# Patient Record
Sex: MALE
[De-identification: names, ages, dates, MRNs, and addresses within clinical notes are randomized per-mention and may not be internally consistent; named-entity substitution may affect disease eponyms.]

---

## 2019-09-12 PROBLEM — Z00.00 ENCOUNTER FOR PREVENTIVE HEALTH EXAMINATION: Status: ACTIVE | Noted: 2019-09-12

## 2019-09-18 ENCOUNTER — APPOINTMENT (OUTPATIENT)
Dept: VASCULAR SURGERY | Facility: CLINIC | Age: 49
End: 2019-09-18
Payer: COMMERCIAL

## 2019-09-18 VITALS
TEMPERATURE: 96.9 F | OXYGEN SATURATION: 96 % | WEIGHT: 235 LBS | HEIGHT: 69 IN | HEART RATE: 79 BPM | DIASTOLIC BLOOD PRESSURE: 92 MMHG | SYSTOLIC BLOOD PRESSURE: 138 MMHG | BODY MASS INDEX: 34.8 KG/M2

## 2019-09-18 DIAGNOSIS — Z80.9 FAMILY HISTORY OF MALIGNANT NEOPLASM, UNSPECIFIED: ICD-10-CM

## 2019-09-18 DIAGNOSIS — I83.893 VARICOSE VEINS OF BILATERAL LOWER EXTREMITIES WITH OTHER COMPLICATIONS: ICD-10-CM

## 2019-09-18 DIAGNOSIS — Z83.0 FAMILY HISTORY OF HUMAN IMMUNODEFICIENCY VIRUS [HIV] DISEASE: ICD-10-CM

## 2019-09-18 DIAGNOSIS — I83.10 VARICOSE VEINS OF UNSPECIFIED LOWER EXTREMITY WITH INFLAMMATION: ICD-10-CM

## 2019-09-18 DIAGNOSIS — Z86.39 PERSONAL HISTORY OF OTHER ENDOCRINE, NUTRITIONAL AND METABOLIC DISEASE: ICD-10-CM

## 2019-09-18 DIAGNOSIS — L30.9 DERMATITIS, UNSPECIFIED: ICD-10-CM

## 2019-09-18 DIAGNOSIS — I83.813 VARICOSE VEINS OF BILATERAL LOWER EXTREMITIES WITH PAIN: ICD-10-CM

## 2019-09-18 DIAGNOSIS — F17.200 NICOTINE DEPENDENCE, UNSPECIFIED, UNCOMPLICATED: ICD-10-CM

## 2019-09-18 PROCEDURE — 99243 OFF/OP CNSLTJ NEW/EST LOW 30: CPT | Mod: 25

## 2019-09-18 PROCEDURE — 93970 EXTREMITY STUDY: CPT

## 2019-09-18 RX ORDER — RAMIPRIL 2.5 MG/1
2.5 CAPSULE ORAL DAILY
Refills: 0 | Status: ACTIVE | COMMUNITY

## 2019-09-18 RX ORDER — GLIMEPIRIDE 4 MG/1
4 TABLET ORAL TWICE DAILY
Refills: 0 | Status: ACTIVE | COMMUNITY

## 2019-09-18 NOTE — DATA REVIEWED
[FreeTextEntry1] : RVT Penalo performed b/l vle - shows severe rt gsv reflux, severe left gsv branch reflux and b/l branch varicose veins - no dvt no svt no deep reflux

## 2019-09-18 NOTE — ADDENDUM
[FreeTextEntry1] : CEAP Classification:\par []     C0----No visible or palpable signs of venous disease\par []     C1----Telangiectasia or reticular veins\par []     C2----Varicose veins; distinguished from reticular veins by a diameter of 3mm or more.\par []     C3----Edema\par x   V1k--Efhmurv in skin and subcutaneous tissues secondary to CVD---Pigmentation or eczema\par []     H2r--Hipsonb in skin and subcutaneous tissues secondary to CVD---Lipodermatosclerosis or atrophic meg\par []     C5----Healed venous ulcer\par []     C6----Active venous ulcer\par x   S ----Symptoms including ache, pain, tightness, skin irritation, heaviness, muscle cramps, and other venous dysfunction\par []     A ----Asymptomatic\par CEAP Sclore  =   C4, S\par \par \par Attribute                            Absent=0                    Mild=1                                   Moderate=2                                           Severe=3\par 1         Pain                          None                          Occasional                           Daily, moderate                                      Daily, severe\par          \par 2        Varicose Veins         None                          Few: branch VV                  Multiple: GS VV                                      Extensive: GS & LS   \par \par 1        Venous Edema         None                         Evening ankle only                Afternoon above ankle                          Morning above ankle\par \par 3        Skin Pigmentation      None or low             Diffuse, limited, old brown    Diffuse, lower 1/3, recent pigment        Above lower 1/3, and recent pigment\par \par 0        Inflammation              None                        Mild cellulitis                           Moderate cellulitis, lower 1/3                 Severe cellulitis, lower 1/3 & above\par \par 0        Induration                  None                        Focal (<5 cm)                        Medial or lateral, < lower 1/3                  Entire lower 1/3   \par \par 0        No.of active ulcers   0                              1                                             2                                                             >2\par \par 0        Active ulceration      None                       < 3 months                             > 3 months, < 1 year                              Not healed > 1 year\par \par 0        Active ulcer, size     None                       < 2 cm diameter                     2-6 cm diameter                                      > 6 cm diameter      \par \par 0        Compressive tx        No use/compliant    Intermittent use                      Wears most days                                   Full compliance\par \par \par 7      TOTAL (max 30 pts) Venous Clinical Severity Score\par

## 2019-09-18 NOTE — PHYSICAL EXAM
[JVD] : no jugular venous distention  [Ankle Swelling (On Exam)] : not present [2+] : left 2+ [Varicose Veins Of Lower Extremities] : bilaterally [Ankle Swelling Bilaterally] : severe [] : of the right leg [Ankle Swelling On The Left] : moderate [Purpura] : no purpura  [Petechiae] : no petechiae [Skin Ulcer] : no ulcer [Skin Induration] : no induration [Alert] : alert [Oriented to Person] : oriented to person [Oriented to Place] : oriented to place [Oriented to Time] : oriented to time [de-identified] : wdwn nad [de-identified] : wnl [de-identified] : scotl [FreeTextEntry1] : rt posterio calf and left thigh branch varicose veins, rt medial ankle venouis dermatitis; b/l leg scattered clusters of small varicose veins and telangiectasias [de-identified] : wnl [de-identified] : as above

## 2019-09-18 NOTE — REASON FOR VISIT
[Initial Evaluation] : an initial evaluation [FreeTextEntry1] : Patient referred by his PMD, Dr. Suazo.  Mr. Reyes with c/o 'itchiness' right medial ankle and varicose veins left thigh and right posterior calf.  Pt c/o ache cramps swelling heaviness fatigue and painful right medial ankle venous dermatitis. No history of venous treatment.  He had worn compression stockings in the past but does not currently wear.   He worked at [employer] and recently retired with plans to relocate to [place] in December.  Significant family history of varicose veins - mother and maternal grandmother.  VLE today confirms no DVT and no SVT bilateral leg, + reflux right gsv and + reflux left superficial tributary branch vein.  Patient with venous dermatitis right medial ankle, dilated branch varicose veins right medial knee/calf and left posterior calf and superficial branch varicose veins bilateral leg.  Recommend thigh high medical grade 20-30 mmHg compression stockings to be worn daily and during flights.  Provided patient with prescription.  Mr. Reyes would benefit from right gsv evlt, left tributary branch evlt followed by bilateral leg microphlebectomy and sclerotherapy.  He will schedule laser ablation and microphlebectomy procedures.  Reviewed post EVLT care with patient.

## 2019-10-02 ENCOUNTER — APPOINTMENT (OUTPATIENT)
Dept: VASCULAR SURGERY | Facility: CLINIC | Age: 49
End: 2019-10-02
Payer: COMMERCIAL

## 2019-10-02 VITALS
DIASTOLIC BLOOD PRESSURE: 87 MMHG | OXYGEN SATURATION: 96 % | TEMPERATURE: 97.7 F | HEART RATE: 90 BPM | SYSTOLIC BLOOD PRESSURE: 124 MMHG

## 2019-10-02 PROCEDURE — 36478 ENDOVENOUS LASER 1ST VEIN: CPT | Mod: RT

## 2019-10-02 NOTE — ADDENDUM
[FreeTextEntry1] : RN reviewed with the patient post-procedure endovenous laser ablation (EVLT) instructions, provided patient with printed copy of instructions along with Dr. Weir's cell number, and advised patient to call for any questions or concerns.\par \par Post-Endovenous Laser Ablation Therapy (EVLT) \par \par You have completed your EVLT and are on your way to better lower extremity venous health.  A medical-grade compression stocking has been placed on your leg to provide important continued support and external compression following the procedure.  \par \par The stocking is to remain in place for the next 7 days and is to be worn 24 hours/day.  This means that you need to sleep with the stocking, too.\par \par For the first three days, you cannot get the stocking wet.  Protect the stocking from getting wet.  If it gets slightly wet, let it air dry or dry with a blow dryer.  After 3 days, you may remove the stocking only to shower/bathe but then-you’ll need to replace the stocking immediately afterwards.  After 7 days you may discontinue use the support hose. \par \par You should expect to feel tightness, pulling, aching in the leg where the treated vein underwent ablation.  This is normal and represents the desired inflammatory response and the closing of the vein. Most people experience the onset of this discomfort after 2-3 days.  It may last 1-2 weeks and will get progressively better each day.  Take Motrin or lbuprofen or Aleve to help ease the discomfort.  You may also notice bruising of the skin- this is also normal and will resolve without difficulty.  Most patients who are scheduled to undergo EVLT on both legs are comfortable and more than ready to proceed with the second procedure after 2 weeks.  If it is necessary to reschedule, please notify the office.\par \par For the first week following your EVLT, avoid heavy exercise.  However, it is important that you walk as much as possible immediately after the procedure and each day for the first week.  Walking outside or on an indoor treadmill for 1 hour are both acceptable.  It is critical NOT to go home and remain sedentary following the procedure.\par \par You should immediately call your doctor if you experience:  fever, redness/blanching of the skin, excessive pain, swelling of the leg or for any concerns that you want to discuss.  \par \par You must be seen in the doctor's office for a follow-up evaluation within the first week after your EVLT and you can expect to undergo a quick ultrasound exam in the office.  This is an important but brief visit to ensure that there are no complications such as a deep vein thrombosis (blood clot). \par  \par Please contact our office to schedule your follow up appointment [phone] if you do not already have a scheduled appointment.  \par \par Dr. Weir’s cell phone number:  [phone]\par \par \par

## 2019-10-02 NOTE — REASON FOR VISIT
[Procedure: _________] : a [unfilled] procedure visit [FreeTextEntry1] : Patient has severe reflux right gsv and here for right gsv EVLT procedure.  RICHARD REYES proceeded with right gsv EVLT with no complications.  RICHARD REYES will follow up in one week for routine visit with VLE.

## 2019-10-02 NOTE — PROCEDURE
[FreeTextEntry1] : Right gsv endovenous laser ablation.  [FreeTextEntry3] : Mr. RICHARD REYES is a 49 year year old M with a history of  right  lower extremity varicose veins previously seen in the office.  Ultrasound examination demonstrated reflux in the right gsv dumping into the patient’s varicosities in the right leg.  A trial of compression stockings, exercise, elevation, and pain medication was attempted without relief and as such, this patient was offered endovenous laser ablation therapy.  After explaining risks and benefits, informed consent was obtained and the patient agreed to proceed.  \par \par The patient was escorted to the procedure room and placed in the  supine  position on the examination table.  Laser safety glasses were placed on the patient.  The right   leg was prepped and draped in the usual sterile fashion and time-out was called.  A sonographic probe was placed into a sterile sheath and the right   lower extremity was imaged.  The right gsv was identified under sonographic guidance and 1 mL of 1% lidocaine was administered subcutaneously using a 25G needle.  Using standard Seldinger technique, access to the right gsv with the needle and corresponding guidewire was obtained.  The 4Fr Jaison-Sheath introducer was advanced over the wire to the treatment location.  The position of the sheath tip is 2 cm below the Sapheno   femoral    Junction and verified using ultrasound guidance.  Dilator and guidewire removed.   NeverTouch laser fiber inserted into the sheath until the Fiber Stop Assembly came in contact with the end of the Sheath Hub.  Fiber stop assembly removed and pulled the sheath back and locked to the Sheath-Anand fitting.  Fiber location confirmed using ultrasound guidance.\par \par Local tumescent anesthesia under ultrasound guidance was administered to ensure delivery of just enough anesthesia, approximately 250 mL, to achieve thermal protection.\par Anesthesia:  Tumescent anesthesia.  Tumescent anesthesia:  250 mL 0.9% Sodium Chloride for injection poured into sterile blue basin and added 83 mL sterile injectable lidocaine 1% (without Epinephrine) using 60 mL syringe.\par \par Laser was set to continuous mode and adjusted to desired power.  Laser placed in Enable mode.  The laser was activated by depressing the foot pedal while withdrawing the fiber and sheath together at a rate of 1 cm per 5 seconds.  The operating of the laser was ceased by removing the foot from foot pedal when the fiber end was 2 - 3 cm from the access site as indicated by markers on the distal tip of the Jaison-Sheath Introducer.   The sheath and laser fiber was removed and manual pressure applied at access site for 5 minutes.  Post procedure, the vein was imaged and showed successful closure of the   right greater saphenous.  Dry dressing applied to access site, compression stocking 20 - 30 mm Hg applied to treated extremity.  The patient tolerated the procedure well with no complications.  Vital signs stable, patient was monitored throughout procedure.  The patient was escorted to the changing room.  \par \par EVLT procedure start time   1133am \par EVLT procedure end time  1148am   \par Fajardo    9\par Length   33  cm\par Energy  1422  Joule\par Time 158    sec\par \par Post-Op Instructions:  The following instructions were reviewed with the patient and a print out of the instructions provided to patient at time of visit:   1)  Compression stocking to be worn 24 hours per day x 7 days.  2)  Do not get the stocking wet for the first 3 days.  3)  Ambulation immediately following procedure and as much as possible for the first 7 days.  4)  Contact the office if any questions or concerns. 5)  Patient must follow-up within the first week for post procedure reflux study performed in office.  Reviewed with the patient the follow up visit is to ensure that there are no complications such as a deep vein thrombosis (DVT).  Patient acknowledged understanding of post-op instructions and was provided with print out of instructions at time of visit.  \par \par \par  [FreeTextEntry2] : \par Right Lower extremity varicose veins with inflammation, leg pain, leg swelling, and leg cramping.  Venous insufficiency, chronic venous hypertension and venous reflux.

## 2019-10-09 ENCOUNTER — APPOINTMENT (OUTPATIENT)
Dept: VASCULAR SURGERY | Facility: CLINIC | Age: 49
End: 2019-10-09
Payer: COMMERCIAL

## 2019-10-09 VITALS
TEMPERATURE: 96.4 F | SYSTOLIC BLOOD PRESSURE: 135 MMHG | HEART RATE: 81 BPM | DIASTOLIC BLOOD PRESSURE: 90 MMHG | OXYGEN SATURATION: 98 %

## 2019-10-09 DIAGNOSIS — I83.11 VARICOSE VEINS OF RIGHT LOWER EXTREMITY WITH INFLAMMATION: ICD-10-CM

## 2019-10-09 DIAGNOSIS — R25.2 CRAMP AND SPASM: ICD-10-CM

## 2019-10-09 DIAGNOSIS — I83.891 VARICOSE VEINS OF RIGHT LOWER EXTREMITY WITH OTHER COMPLICATIONS: ICD-10-CM

## 2019-10-09 DIAGNOSIS — I83.811 VARICOSE VEINS OF RIGHT LOWER EXTREMITY WITH PAIN: ICD-10-CM

## 2019-10-09 PROCEDURE — 93971 EXTREMITY STUDY: CPT

## 2019-10-09 PROCEDURE — 99214 OFFICE O/P EST MOD 30 MIN: CPT | Mod: 25

## 2019-10-09 NOTE — PHYSICAL EXAM
[JVD] : no jugular venous distention  [2+] : left 2+ [Ankle Swelling (On Exam)] : not present [Varicose Veins Of Lower Extremities] : bilaterally [Ankle Swelling Bilaterally] : severe [] : of the right leg [Ankle Swelling On The Left] : moderate [Purpura] : no purpura  [Petechiae] : no petechiae [Skin Ulcer] : no ulcer [Skin Induration] : no induration [Alert] : alert [Oriented to Person] : oriented to person [Oriented to Place] : oriented to place [Oriented to Time] : oriented to time [Calm] : calm [de-identified] : wdwn nad [de-identified] : wnl [de-identified] : scotl [FreeTextEntry1] : rt posterior calf and left thigh branch varicose veins, rt medial ankle venous dermatitis; b/l leg scattered clusters of small varicose veins and telangiectasias [de-identified] : wnl [de-identified] : dark raised pigmented lesion dorsum left foot (pix taken)

## 2019-10-09 NOTE — REASON FOR VISIT
[Follow-Up: _____] : a [unfilled] follow-up visit [FreeTextEntry1] : He is s/p right gsv EVLT on 10/02/2019.  Patient is here today for routine follow up with right VLE.  He reports slight soreness along treated vein; reviewed with patient soreness is normal and will resolve over time.  VLE today confirms no DVT and right gsv closed consistent with prior laser ablation procedure.  He has + reflux left gsv and is scheduled for left gsv evlt on 1016/19.  He has raised, irregular, pigmented lesion left dorsum of foot (photo taken); referred Mr. Reyes to dermatologist, Dr. Bragg, for evaluation of lesion.

## 2019-10-16 ENCOUNTER — APPOINTMENT (OUTPATIENT)
Dept: VASCULAR SURGERY | Facility: CLINIC | Age: 49
End: 2019-10-16
Payer: COMMERCIAL

## 2019-10-16 VITALS
DIASTOLIC BLOOD PRESSURE: 90 MMHG | HEART RATE: 77 BPM | SYSTOLIC BLOOD PRESSURE: 133 MMHG | OXYGEN SATURATION: 98 % | TEMPERATURE: 96.6 F

## 2019-10-16 PROCEDURE — 36478 ENDOVENOUS LASER 1ST VEIN: CPT | Mod: LT

## 2019-10-16 NOTE — ADDENDUM
[FreeTextEntry1] : RN reviewed with the patient post-procedure endovenous laser ablation (EVLT) instructions, provided patient with printed copy of instructions along with Dr. Weir's cell number, and advised patient to call for any questions or concerns.\par \par Post-Endovenous Laser Ablation Therapy (EVLT) \par \par You have completed your EVLT and are on your way to better lower extremity venous health.  A medical-grade compression stocking has been placed on your leg to provide important continued support and external compression following the procedure.  \par \par The stocking is to remain in place for the next 7 days and is to be worn 24 hours/day.  This means that you need to sleep with the stocking, too.\par \par For the first three days, you cannot get the stocking wet.  Protect the stocking from getting wet.  If it gets slightly wet, let it air dry or dry with a blow dryer.  After 3 days, you may remove the stocking only to shower/bathe but then-you’ll need to replace the stocking immediately afterwards.  After 7 days you may discontinue use the support hose. \par \par You should expect to feel tightness, pulling, aching in the leg where the treated vein underwent ablation.  This is normal and represents the desired inflammatory response and the closing of the vein. Most people experience the onset of this discomfort after 2-3 days.  It may last 1-2 weeks and will get progressively better each day.  Take Motrin or lbuprofen or Aleve to help ease the discomfort.  You may also notice bruising of the skin- this is also normal and will resolve without difficulty.  Most patients who are scheduled to undergo EVLT on both legs are comfortable and more than ready to proceed with the second procedure after 2 weeks.  If it is necessary to reschedule, please notify the office.\par \par For the first week following your EVLT, avoid heavy exercise.  However, it is important that you walk as much as possible immediately after the procedure and each day for the first week.  Walking outside or on an indoor treadmill for 1 hour are both acceptable.  It is critical NOT to go home and remain sedentary following the procedure.\par \par You should immediately call your doctor if you experience:  fever, redness/blanching of the skin, excessive pain, swelling of the leg or for any concerns that you want to discuss.  \par \par You must be seen in the doctor's office for a follow-up evaluation within the first week after your EVLT and you can expect to undergo a quick ultrasound exam in the office.  This is an important but brief visit to ensure that there are no complications such as a deep vein thrombosis (blood clot). \par  \par Please contact our office to schedule your follow up appointment [phone] if you do not already have a scheduled appointment.  \par \par Dr. Weir’s cell phone number:  [phone]\par \par \par

## 2019-10-16 NOTE — PROCEDURE
[FreeTextEntry1] : Left gsv  endovenous laser ablation.  [FreeTextEntry2] : Left lower extremity varicose veins with inflammation, leg pain, leg swelling, and leg cramping.  Venous insufficiency, chronic venous hypertension and venous reflux. [FreeTextEntry3] : Mr. RICHARD REYES is a 49 year year old M with a history of   left  lower extremity varicose veins previously seen in the office.  Ultrasound examination demonstrated reflux in the left gsv dumping into the patient’s varicosities in the  left leg.  A trial of compression stockings, exercise, elevation, and pain medication was attempted without relief and as such, this patient was offered endovenous laser ablation therapy.  After explaining risks and benefits, informed consent was obtained and the patient agreed to proceed.  \par \par The patient was escorted to the procedure room and placed in the supine  position on the examination table.  Laser safety glasses were placed on the patient.  The  left  leg was prepped and draped in the usual sterile fashion and time-out was called.  A sonographic probe was placed into a sterile sheath and the  left lower extremity was imaged.  The left gsv was identified under sonographic guidance and 1 mL of 1% lidocaine was administered subcutaneously using a 25G needle.  Using standard Seldinger technique, access to the  left gsv with the needle and corresponding guidewire was obtained.  The 4Fr Jaison-Sheath introducer was advanced over the wire to the treatment location.  The position of the sheath tip is 2 cm below the Sapheno  femoral   Junction and verified using ultrasound guidance.  Dilator and guidewire removed.   NeverTouch laser fiber inserted into the sheath until the Fiber Stop Assembly came in contact with the end of the Sheath Hub.  Fiber stop assembly removed and pulled the sheath back and locked to the Sheath-Anand fitting.  Fiber location confirmed using ultrasound guidance.\par \par Local tumescent anesthesia under ultrasound guidance was administered to ensure delivery of just enough anesthesia, approximately 250 mL, to achieve thermal protection.\par Anesthesia:  Tumescent anesthesia.  Tumescent anesthesia:  250 mL 0.9% Sodium Chloride for injection poured into sterile blue basin and added 83 mL sterile injectable lidocaine 1% (without Epinephrine) using 60 mL syringe.\par \par Laser was set to continuous mode and adjusted to desired power.  Laser placed in Enable mode.  The laser was activated by depressing the foot pedal while withdrawing the fiber and sheath together at a rate of 1 cm per 5 seconds.  The operating of the laser was ceased by removing the foot from foot pedal when the fiber end was 2 - 3 cm from the access site as indicated by markers on the distal tip of the Jaison-Sheath Introducer.   The sheath and laser fiber was removed and manual pressure applied at access site for 5 minutes.  Post procedure, the vein was imaged and showed successful closure of the left greater saphenous   vein.  Dry dressing applied to access site, compression stocking 20 - 30 mm Hg applied to treated extremity.  The patient tolerated the procedure well with no complications.  Vital signs stable, patient was monitored throughout procedure.  The patient was escorted to the changing room.  \par \par EVLT procedure start time    949am \par EVLT procedure end time    1002 am\par Fajardo   9  \par Length  33   cm\par Energy  1506    Joule\par Time  167    sec\par \par Post-Op Instructions:  The following instructions were reviewed with the patient and a print out of the instructions provided to patient at time of visit:   1)  Compression stocking to be worn 24 hours per day x 7 days.  2)  Do not get the stocking wet for the first 3 days.  3)  Ambulation immediately following procedure and as much as possible for the first 7 days.  4)  Contact the office if any questions or concerns. 5)  Patient must follow-up within the first week for post procedure reflux study performed in office.  Reviewed with the patient the follow up visit is to ensure that there are no complications such as a deep vein thrombosis (DVT).  Patient acknowledged understanding of post-op instructions and was provided with print out of instructions at time of visit.  \par \par \par

## 2019-10-16 NOTE — REASON FOR VISIT
[Procedure: _________] : a [unfilled] procedure visit [FreeTextEntry1] : Patient has severe reflux left gsv and here for  left gsv EVLT procedure.  RICHARD REYES proceeded with left gsv  EVLT with no complications.  RICHARD REYES will follow up in one week for routine visit with VLE.

## 2019-10-23 ENCOUNTER — APPOINTMENT (OUTPATIENT)
Dept: VASCULAR SURGERY | Facility: CLINIC | Age: 49
End: 2019-10-23
Payer: COMMERCIAL

## 2019-10-23 VITALS
HEART RATE: 82 BPM | SYSTOLIC BLOOD PRESSURE: 149 MMHG | TEMPERATURE: 97.8 F | DIASTOLIC BLOOD PRESSURE: 90 MMHG | OXYGEN SATURATION: 97 %

## 2019-10-23 DIAGNOSIS — I83.892 VARICOSE VEINS OF LEFT LOWER EXTREMITY WITH OTHER COMPLICATIONS: ICD-10-CM

## 2019-10-23 DIAGNOSIS — I83.12 VARICOSE VEINS OF LEFT LOWER EXTREMITY WITH INFLAMMATION: ICD-10-CM

## 2019-10-23 DIAGNOSIS — M79.605 PAIN IN LEFT LEG: ICD-10-CM

## 2019-10-23 DIAGNOSIS — I83.812 VARICOSE VEINS OF LEFT LOWER EXTREMITY WITH PAIN: ICD-10-CM

## 2019-10-23 DIAGNOSIS — I87.2 VENOUS INSUFFICIENCY (CHRONIC) (PERIPHERAL): ICD-10-CM

## 2019-10-23 PROCEDURE — 99214 OFFICE O/P EST MOD 30 MIN: CPT | Mod: 25

## 2019-10-23 PROCEDURE — 93971 EXTREMITY STUDY: CPT

## 2019-10-23 NOTE — DATA REVIEWED
[FreeTextEntry1] : RVT Penalo performed left vle shows left gsv/accessory vein closed no dvt no truncal reflux

## 2019-10-23 NOTE — PHYSICAL EXAM
[2+] : right 2+ [Varicose Veins Of Lower Extremities] : bilaterally [Ankle Swelling Bilaterally] : severe [] : of the right leg [Ankle Swelling On The Left] : moderate [Alert] : alert [Oriented to Person] : oriented to person [Oriented to Place] : oriented to place [Calm] : calm [Oriented to Time] : oriented to time [JVD] : no jugular venous distention  [Ankle Swelling (On Exam)] : not present [Purpura] : no purpura  [Petechiae] : no petechiae [Skin Ulcer] : no ulcer [Skin Induration] : no induration [de-identified] : wdwn nad [de-identified] : wnl [de-identified] : scotl [FreeTextEntry1] : rt posterior calf and left thigh branch varicose veins much smaller s/p recent b/l evlt, rt medial ankle venous dermatitis; b/l leg scattered clusters of small varicose veins and telangiectasias [de-identified] : wnl [de-identified] : dark raised pigmented lesion dorsum left foot (pix taken)

## 2019-10-23 NOTE — REASON FOR VISIT
[Follow-Up: _____] : a [unfilled] follow-up visit [FreeTextEntry1] : He is s/p left gsv EVLT on 10/16/2019.  Patient is here today for routine follow up with left VLE.  VLE today confirms no DVT and left gsv/superficial accessory vein closed consistent with prior laser ablation.  He has dilated branch varicose veins bilateral leg and noted decrease in size since the EVLT procedures.  He has dilated superficial branch varicose veins bilateral leg and would benefit from bilateral sclerotherapy.  He will return in 3 months for re-evaluation with VLE prior to sclerotherapy. Pt also has appt to see Dr Bragg to evaluate dark nodular lesion left foot.

## 2019-10-30 ENCOUNTER — APPOINTMENT (OUTPATIENT)
Dept: VASCULAR SURGERY | Facility: CLINIC | Age: 49
End: 2019-10-30

## 2019-11-12 ENCOUNTER — APPOINTMENT (OUTPATIENT)
Dept: VASCULAR SURGERY | Facility: CLINIC | Age: 49
End: 2019-11-12

## 2019-11-20 ENCOUNTER — LABORATORY RESULT (OUTPATIENT)
Age: 49
End: 2019-11-20

## 2019-11-21 ENCOUNTER — TRANSCRIPTION ENCOUNTER (OUTPATIENT)
Age: 49
End: 2019-11-21

## 2019-11-21 ENCOUNTER — APPOINTMENT (OUTPATIENT)
Dept: DERMATOLOGY | Facility: CLINIC | Age: 49
End: 2019-11-21
Payer: COMMERCIAL

## 2019-11-21 VITALS — DIASTOLIC BLOOD PRESSURE: 83 MMHG | SYSTOLIC BLOOD PRESSURE: 128 MMHG

## 2019-11-21 DIAGNOSIS — D48.7 NEOPLASM OF UNCERTAIN BEHAVIOR OF OTHER SPECIFIED SITES: ICD-10-CM

## 2019-11-21 DIAGNOSIS — L82.1 OTHER SEBORRHEIC KERATOSIS: ICD-10-CM

## 2019-11-21 DIAGNOSIS — D22.9 MELANOCYTIC NEVI, UNSPECIFIED: ICD-10-CM

## 2019-11-21 DIAGNOSIS — Z91.89 OTHER SPECIFIED PERSONAL RISK FACTORS, NOT ELSEWHERE CLASSIFIED: ICD-10-CM

## 2019-11-21 PROCEDURE — 11102 TANGNTL BX SKIN SINGLE LES: CPT

## 2019-11-21 PROCEDURE — 99203 OFFICE O/P NEW LOW 30 MIN: CPT | Mod: 25

## 2019-11-27 ENCOUNTER — APPOINTMENT (OUTPATIENT)
Dept: VASCULAR SURGERY | Facility: CLINIC | Age: 49
End: 2019-11-27

## 2019-12-11 ENCOUNTER — APPOINTMENT (OUTPATIENT)
Dept: VASCULAR SURGERY | Facility: CLINIC | Age: 49
End: 2019-12-11

## 2019-12-13 ENCOUNTER — APPOINTMENT (OUTPATIENT)
Dept: DERMATOLOGY | Facility: CLINIC | Age: 49
End: 2019-12-13
Payer: COMMERCIAL

## 2019-12-13 DIAGNOSIS — B35.3 TINEA PEDIS: ICD-10-CM

## 2019-12-13 DIAGNOSIS — B35.4 TINEA CORPORIS: ICD-10-CM

## 2019-12-13 PROCEDURE — 99213 OFFICE O/P EST LOW 20 MIN: CPT

## 2019-12-13 RX ORDER — KETOCONAZOLE 20 MG/G
2 CREAM TOPICAL TWICE DAILY
Qty: 1 | Refills: 1 | Status: ACTIVE | COMMUNITY
Start: 2019-12-13 | End: 1900-01-01

## 2024-04-04 ENCOUNTER — APPOINTMENT (RX ONLY)
Dept: URBAN - METROPOLITAN AREA CLINIC 81 | Facility: CLINIC | Age: 54
Setting detail: DERMATOLOGY
End: 2024-04-04

## 2024-04-04 DIAGNOSIS — D17 BENIGN LIPOMATOUS NEOPLASM: ICD-10-CM

## 2024-04-04 DIAGNOSIS — L30.9 DERMATITIS, UNSPECIFIED: ICD-10-CM | Status: INADEQUATELY CONTROLLED

## 2024-04-04 DIAGNOSIS — D22 MELANOCYTIC NEVI: ICD-10-CM

## 2024-04-04 PROBLEM — D22.72 MELANOCYTIC NEVI OF LEFT LOWER LIMB, INCLUDING HIP: Status: ACTIVE | Noted: 2024-04-04

## 2024-04-04 PROBLEM — D17.1 BENIGN LIPOMATOUS NEOPLASM OF SKIN AND SUBCUTANEOUS TISSUE OF TRUNK: Status: ACTIVE | Noted: 2024-04-04

## 2024-04-04 PROCEDURE — ? COUNSELING

## 2024-04-04 PROCEDURE — ? PRESCRIPTION

## 2024-04-04 PROCEDURE — 99204 OFFICE O/P NEW MOD 45 MIN: CPT

## 2024-04-04 PROCEDURE — ? DEFER

## 2024-04-04 RX ORDER — MOMETASONE FUROATE 1 MG/G
CREAM TOPICAL QD
Qty: 15 | Refills: 0 | Status: ERX | COMMUNITY
Start: 2024-04-04

## 2024-04-04 RX ADMIN — MOMETASONE FUROATE: 1 CREAM TOPICAL at 00:00

## 2024-04-04 ASSESSMENT — LOCATION SIMPLE DESCRIPTION DERM
LOCATION SIMPLE: RIGHT FOREARM
LOCATION SIMPLE: LEFT UPPER BACK
LOCATION SIMPLE: LEFT FOOT

## 2024-04-04 ASSESSMENT — LOCATION DETAILED DESCRIPTION DERM
LOCATION DETAILED: LEFT DORSAL FOOT
LOCATION DETAILED: LEFT SUPERIOR LATERAL UPPER BACK
LOCATION DETAILED: RIGHT VENTRAL PROXIMAL FOREARM

## 2024-04-04 ASSESSMENT — LOCATION ZONE DERM
LOCATION ZONE: FEET
LOCATION ZONE: ARM
LOCATION ZONE: TRUNK

## 2024-04-04 NOTE — HPI: SKIN LESION
What Type Of Note Output Would You Prefer (Optional)?: Standard Output
How Severe Is Your Skin Lesion?: moderate
[id number]: treated_been_treated
Is This A New Presentation, Or A Follow-Up?: Skin Lesion

## 2025-02-19 ENCOUNTER — APPOINTMENT (OUTPATIENT)
Dept: URBAN - METROPOLITAN AREA CLINIC 81 | Facility: CLINIC | Age: 55
Setting detail: DERMATOLOGY
End: 2025-02-19

## 2025-02-19 DIAGNOSIS — L84 CORNS AND CALLOSITIES: ICD-10-CM

## 2025-02-19 DIAGNOSIS — L30.9 DERMATITIS, UNSPECIFIED: ICD-10-CM | Status: INADEQUATELY CONTROLLED

## 2025-02-19 PROCEDURE — ? BENIGN DESTRUCTION

## 2025-02-19 PROCEDURE — ? COUNSELING

## 2025-02-19 PROCEDURE — 17110 DESTRUCTION B9 LES UP TO 14: CPT

## 2025-02-19 PROCEDURE — 99214 OFFICE O/P EST MOD 30 MIN: CPT | Mod: 25

## 2025-02-19 PROCEDURE — ? PRESCRIPTION

## 2025-02-19 RX ORDER — CLOBETASOL PROPIONATE 0.5 MG/G
CREAM TOPICAL BID
Qty: 45 | Refills: 1 | Status: ERX | COMMUNITY
Start: 2025-02-19

## 2025-02-19 RX ADMIN — CLOBETASOL PROPIONATE: 0.5 CREAM TOPICAL at 00:00

## 2025-02-19 ASSESSMENT — LOCATION SIMPLE DESCRIPTION DERM
LOCATION SIMPLE: LEFT THIGH
LOCATION SIMPLE: LEFT HAND
LOCATION SIMPLE: RIGHT HAND

## 2025-02-19 ASSESSMENT — LOCATION DETAILED DESCRIPTION DERM
LOCATION DETAILED: LEFT ANTERIOR PROXIMAL THIGH
LOCATION DETAILED: RIGHT THENAR EMINENCE
LOCATION DETAILED: LEFT ULNAR PALM
LOCATION DETAILED: LEFT THENAR EMINENCE

## 2025-02-19 ASSESSMENT — LOCATION ZONE DERM
LOCATION ZONE: LEG
LOCATION ZONE: HAND

## 2025-02-19 NOTE — PROCEDURE: BENIGN DESTRUCTION
Render Post-Care Instructions In Note?: no
Post-Care Instructions: I reviewed with the patient in detail post-care instructions. Patient is to wear sunprotection, and avoid picking at any of the treated lesions. Pt may apply Vaseline to crusted or scabbing areas.
Treatment Number (Will Not Render If 0): 0
Medical Necessity Clause: This procedure was medically necessary because the lesions that were treated were:
Detail Level: Detailed
Consent: The patient's consent was obtained including but not limited to risks of crusting, scabbing, blistering, scarring, darker or lighter pigmentary change, recurrence, incomplete removal and infection.
Medical Necessity Information: It is in your best interest to select a reason for this procedure from the list below. All of these items fulfill various CMS LCD requirements except the new and changing color options.